# Patient Record
Sex: MALE | Race: WHITE | NOT HISPANIC OR LATINO | Employment: FULL TIME | URBAN - METROPOLITAN AREA
[De-identification: names, ages, dates, MRNs, and addresses within clinical notes are randomized per-mention and may not be internally consistent; named-entity substitution may affect disease eponyms.]

---

## 2024-12-02 ENCOUNTER — HOSPITAL ENCOUNTER (EMERGENCY)
Facility: HOSPITAL | Age: 29
Discharge: HOME | End: 2024-12-02
Attending: STUDENT IN AN ORGANIZED HEALTH CARE EDUCATION/TRAINING PROGRAM
Payer: COMMERCIAL

## 2024-12-02 VITALS
HEART RATE: 96 BPM | SYSTOLIC BLOOD PRESSURE: 142 MMHG | TEMPERATURE: 98.4 F | WEIGHT: 207.01 LBS | HEIGHT: 73 IN | OXYGEN SATURATION: 97 % | BODY MASS INDEX: 27.44 KG/M2 | RESPIRATION RATE: 15 BRPM | DIASTOLIC BLOOD PRESSURE: 90 MMHG

## 2024-12-02 DIAGNOSIS — T15.92XA FOREIGN BODY OF LEFT EYE, INITIAL ENCOUNTER: ICD-10-CM

## 2024-12-02 DIAGNOSIS — S05.02XA ABRASION OF LEFT CORNEA, INITIAL ENCOUNTER: Primary | ICD-10-CM

## 2024-12-02 PROCEDURE — 99283 EMERGENCY DEPT VISIT LOW MDM: CPT

## 2024-12-02 PROCEDURE — 2500000005 HC RX 250 GENERAL PHARMACY W/O HCPCS: Performed by: STUDENT IN AN ORGANIZED HEALTH CARE EDUCATION/TRAINING PROGRAM

## 2024-12-02 PROCEDURE — 99282 EMERGENCY DEPT VISIT SF MDM: CPT | Performed by: STUDENT IN AN ORGANIZED HEALTH CARE EDUCATION/TRAINING PROGRAM

## 2024-12-02 RX ORDER — TETRACAINE HYDROCHLORIDE 5 MG/ML
1 SOLUTION OPHTHALMIC ONCE
Status: COMPLETED | OUTPATIENT
Start: 2024-12-02 | End: 2024-12-02

## 2024-12-02 RX ORDER — ERYTHROMYCIN 5 MG/G
OINTMENT OPHTHALMIC EVERY 6 HOURS
Qty: 1 G | Refills: 0 | Status: SHIPPED | OUTPATIENT
Start: 2024-12-02 | End: 2024-12-07

## 2024-12-02 ASSESSMENT — VISUAL ACUITY
OU: 20/20
OD: 20/20
OS: 20/20

## 2024-12-02 ASSESSMENT — COLUMBIA-SUICIDE SEVERITY RATING SCALE - C-SSRS
6. HAVE YOU EVER DONE ANYTHING, STARTED TO DO ANYTHING, OR PREPARED TO DO ANYTHING TO END YOUR LIFE?: NO
2. HAVE YOU ACTUALLY HAD ANY THOUGHTS OF KILLING YOURSELF?: NO
1. IN THE PAST MONTH, HAVE YOU WISHED YOU WERE DEAD OR WISHED YOU COULD GO TO SLEEP AND NOT WAKE UP?: NO

## 2024-12-02 ASSESSMENT — TONOMETRY
OS_IOP_MMHG: 13
IOP_HANDHELD: 1
OD_IOP_MMHG: 20

## 2024-12-02 ASSESSMENT — PAIN - FUNCTIONAL ASSESSMENT: PAIN_FUNCTIONAL_ASSESSMENT: 0-10

## 2024-12-02 ASSESSMENT — PAIN SCALES - GENERAL: PAINLEVEL_OUTOF10: 0 - NO PAIN

## 2024-12-02 NOTE — ED PROVIDER NOTES
HPI   No chief complaint on file.      Patient is a 29-year-old male that presents emergency department for evaluation of left eye pain, slight blurred vision.  Patient states he was playing with his son 2 days ago when he got poked in his left eye.  He states he is been rubbing it and has since had a burning sensation in the left eye and an aching pain.  Nothing seems to make it better, made worse with blinking.  No medication were taken prior to arrival.  He is concerned he may have gotten something in his eye or cause a scratch and came in for further evaluation.  Tetanus was updated with the last 5 years.      History provided by:  Patient          Patient History   No past medical history on file.  No past surgical history on file.  No family history on file.  Social History     Tobacco Use    Smoking status: Not on file    Smokeless tobacco: Not on file   Substance Use Topics    Alcohol use: Not on file    Drug use: Not on file       Physical Exam   ED Triage Vitals   Temp Pulse Resp BP   -- -- -- --      SpO2 Temp src Heart Rate Source Patient Position   -- -- -- --      BP Location FiO2 (%)     -- --       Physical Exam  Vitals and nursing note reviewed.   Constitutional:       General: He is not in acute distress.     Appearance: Normal appearance. He is not ill-appearing.   HENT:      Head: Normocephalic and atraumatic.      Mouth/Throat:      Mouth: Mucous membranes are moist.   Eyes:      General: Lids are normal. No visual field deficit or scleral icterus.        Right eye: No foreign body, discharge or hordeolum.         Left eye: Foreign body present.No discharge or hordeolum.      Intraocular pressure: Right eye pressure is 20 mmHg. Left eye pressure is 13 mmHg. Measurements were taken using a handheld tonometer.     Extraocular Movements: Extraocular movements intact.      Right eye: Normal extraocular motion and no nystagmus.      Left eye: Normal extraocular motion and no nystagmus.       Conjunctiva/sclera:      Right eye: Right conjunctiva is not injected. No chemosis, exudate or hemorrhage.     Left eye: Left conjunctiva is injected. No chemosis, exudate or hemorrhage.     Pupils: Pupils are equal, round, and reactive to light.      Comments: No Jean Marie sign seen.  There is a small corneal abrasion over the middle of the left cornea with surrounding ulceration.   Neurological:      Mental Status: He is alert.           ED Course & MDM   Diagnoses as of 12/02/24 0746   Abrasion of left cornea, initial encounter   Foreign body of left eye, initial encounter                 No data recorded                                 Medical Decision Making  Patient is a 29-year-old male that presented to the emergency department for evaluation of left eye pain and blurred vision.  Patient awake, alert and oriented.  He does have normal vision in both eyes on arrival.  There is a corneal abrasion on the left eye as well as a small foreign body seen.  This was able to be removed without difficulty using a cotton swab.  There is no Jean Marie sign seen on exam and no further foreign body seen after the initial foreign body was removed.  Given patient's ulceration you will be started on erythromycin ointment and given ophthalmology to follow-up with.  Return precautions were discussed with patient and he is agreeable to plan.        Procedure  Procedures     Nathan Michel, DO  12/02/24 0749

## 2024-12-02 NOTE — DISCHARGE INSTRUCTIONS
Use erythromycin ointment as prescribed for the next 5 days.  Follow-up with ophthalmology within the next week for reevaluation.  If symptoms worsen or change he can return at any time for further evaluation and treatment.